# Patient Record
Sex: MALE | Race: WHITE | NOT HISPANIC OR LATINO | URBAN - METROPOLITAN AREA
[De-identification: names, ages, dates, MRNs, and addresses within clinical notes are randomized per-mention and may not be internally consistent; named-entity substitution may affect disease eponyms.]

---

## 2023-03-14 ENCOUNTER — OUTPATIENT (OUTPATIENT)
Dept: OUTPATIENT SERVICES | Facility: HOSPITAL | Age: 73
LOS: 1 days | End: 2023-03-14

## 2023-03-14 ENCOUNTER — APPOINTMENT (OUTPATIENT)
Dept: OPHTHALMOLOGY | Facility: CLINIC | Age: 73
End: 2023-03-14

## 2023-03-16 DIAGNOSIS — H26.9 UNSPECIFIED CATARACT: ICD-10-CM

## 2023-03-28 PROBLEM — Z00.00 ENCOUNTER FOR PREVENTIVE HEALTH EXAMINATION: Status: ACTIVE | Noted: 2023-03-28

## 2023-03-29 RX ORDER — TROPICAMIDE 1 %
1 DROPS OPHTHALMIC (EYE)
Refills: 0 | Status: COMPLETED | OUTPATIENT
Start: 2023-04-03 | End: 2023-04-03

## 2023-03-29 RX ORDER — PHENYLEPHRINE HCL 2.5 %
1 DROPS OPHTHALMIC (EYE)
Refills: 0 | Status: COMPLETED | OUTPATIENT
Start: 2023-04-03 | End: 2023-04-03

## 2023-03-29 RX ORDER — SODIUM CHLORIDE 9 MG/ML
1000 INJECTION, SOLUTION INTRAVENOUS
Refills: 0 | Status: DISCONTINUED | OUTPATIENT
Start: 2023-04-03 | End: 2023-04-03

## 2023-03-29 RX ORDER — OFLOXACIN 0.3 %
1 DROPS OPHTHALMIC (EYE)
Refills: 0 | Status: COMPLETED | OUTPATIENT
Start: 2023-04-03 | End: 2023-04-03

## 2023-03-29 RX ORDER — CYCLOPENTOLATE HYDROCHLORIDE 10 MG/ML
1 SOLUTION/ DROPS OPHTHALMIC
Refills: 0 | Status: COMPLETED | OUTPATIENT
Start: 2023-04-03 | End: 2023-04-03

## 2023-03-31 NOTE — ASU PATIENT PROFILE, ADULT - ABLE TO REACH PT
Voicemail; arrival time 12noon/ no solid food or milk after 12 mid-night on sunday/ water or clear apple juice no later than 10am DOS/ photo ID and insurance card/ comfortable clothing/ escort to take you home/ address and phone number/no

## 2023-03-31 NOTE — ASU PATIENT PROFILE, ADULT - FALL HARM RISK - UNIVERSAL INTERVENTIONS
Bed in lowest position, wheels locked, appropriate side rails in place/Call bell, personal items and telephone in reach/Instruct patient to call for assistance before getting out of bed or chair/Non-slip footwear when patient is out of bed/[place] to call system/Physically safe environment - no spills, clutter or unnecessary equipment/Purposeful Proactive Rounding/Room/bathroom lighting operational, light cord in reach

## 2023-03-31 NOTE — ASU PATIENT PROFILE, ADULT - NSICDXPASTSURGICALHX_GEN_ALL_CORE_FT
PAST SURGICAL HISTORY:  H/O hernia repair     H/O knee surgery     H/O prostatectomy     H/O shoulder surgery     History of fusion of lumbar spine     History of tonsillectomy

## 2023-04-03 ENCOUNTER — TRANSCRIPTION ENCOUNTER (OUTPATIENT)
Age: 73
End: 2023-04-03

## 2023-04-03 ENCOUNTER — OUTPATIENT (OUTPATIENT)
Dept: OUTPATIENT SERVICES | Facility: HOSPITAL | Age: 73
LOS: 1 days | Discharge: HOME | End: 2023-04-03

## 2023-04-03 VITALS
SYSTOLIC BLOOD PRESSURE: 120 MMHG | TEMPERATURE: 98 F | DIASTOLIC BLOOD PRESSURE: 70 MMHG | WEIGHT: 157.85 LBS | HEART RATE: 62 BPM | RESPIRATION RATE: 14 BRPM | HEIGHT: 70 IN

## 2023-04-03 VITALS
DIASTOLIC BLOOD PRESSURE: 67 MMHG | HEART RATE: 60 BPM | RESPIRATION RATE: 16 BRPM | TEMPERATURE: 97 F | SYSTOLIC BLOOD PRESSURE: 120 MMHG

## 2023-04-03 DIAGNOSIS — Z90.89 ACQUIRED ABSENCE OF OTHER ORGANS: Chronic | ICD-10-CM

## 2023-04-03 DIAGNOSIS — Z90.79 ACQUIRED ABSENCE OF OTHER GENITAL ORGAN(S): Chronic | ICD-10-CM

## 2023-04-03 DIAGNOSIS — Z98.1 ARTHRODESIS STATUS: Chronic | ICD-10-CM

## 2023-04-03 DIAGNOSIS — Z98.890 OTHER SPECIFIED POSTPROCEDURAL STATES: Chronic | ICD-10-CM

## 2023-04-03 DEVICE — LENS IOL ACRYSOF SN60WF 22.5D
Type: IMPLANTABLE DEVICE | Site: LEFT (LEFT EYE) | Status: NON-FUNCTIONAL
Removed: 2023-04-03

## 2023-04-03 RX ADMIN — Medication 1 DROP(S): at 13:20

## 2023-04-03 RX ADMIN — Medication 1 DROP(S): at 13:15

## 2023-04-03 RX ADMIN — Medication 1 DROP(S): at 13:25

## 2023-04-03 RX ADMIN — CYCLOPENTOLATE HYDROCHLORIDE 1 DROP(S): 10 SOLUTION/ DROPS OPHTHALMIC at 13:15

## 2023-04-03 RX ADMIN — CYCLOPENTOLATE HYDROCHLORIDE 1 DROP(S): 10 SOLUTION/ DROPS OPHTHALMIC at 13:20

## 2023-04-03 RX ADMIN — CYCLOPENTOLATE HYDROCHLORIDE 1 DROP(S): 10 SOLUTION/ DROPS OPHTHALMIC at 13:25

## 2023-04-03 NOTE — OPERATIVE REPORT - OPERATIVE RPOSRT DETAILS
Date of Procedure:04/03/2023    Surgeon: Adali Alfaro    Assistant Surgeon: Elise Lara MD    PRE-OP DIAGNOSIS: Floppy Iris, Complex cataract extraction with Trypan Blue,  Astigmatism-_____eye    POST-OP DIAGNOSIS: Same    ANESTHESIA: MAC    PROCEDURE: Complex (FLACS) cataract extraction Lens Implant     SPECIMEN/TISSUE REMOVED: None    ESTIMATED BLOOD LOSS: < 1mL    COMPLICATIONS: None    OPERATIVE PROCEDURE:  Complex Femtosecond laser assisted cataract surgery (FLACS) with insertion of posterior chamber intraocular lens, ______eye    The patient was brought into the laser room. After installation of topical anesthetic the femtosecond laser was used for select steps of the cataract procedure.   The patient was brought into the operating room and placed supine on the operating room table. After uneventful induction of neuroleptic anesthesia, additional topical tetracaine was instilled into the operative eye achieving sufficient anesthesia. The patient was then prepped and draped in the usual sterile fashion. A wire lid speculum was then inserted into the operative eye giving a wide palpebral fissure.    Two  paracentesis through clear cornea were created.  The anterior chamber was irrigated with lidocaine 1% nonpreserved. One percent preservative free lidocaine was injected into the anterior chamber.  The Iris was Billowing and prolapsing thru the paracentesis.  Preservative free Epinephreine was injected to stabalize iris and increase dilation.  Trypan blue was injected into the anterior chamber and irrigated out with balanced salt solution.   Viscoelastic was then used to maintain the anterior chamber. A keratome was used to create a clear corneal incision just inside the temporal limbus. A Utrata forceps was used to complete the anterior capsulorrhexis and the freed capsule was removed from the eye. Balanced salt solution was used to hydrodissect the nucleus. The phacoemulsification unit was then used to completely phacoemulsify the nucleus, following which an aspirating hand piece was used to aspirate all cortical remnants from the capsular bag. Viscoelastic was again used to reform the anterior chamber and capsular bag.    A new foldable posterior chamber intraocular lens was brought into the surgical field. It was folded and directly injected into the capsular bag following which it was centered and secure. All viscoelastic was then aspirated from the anterior segment using an aspirating hand piece. All wounds were hydrated and found to be watertight.  The wounds remained watertight. The lid speculum was removed from the eye and a shield placed over the eye.  The patient received topical Vigamox and pred forte eye drops. The patient also received Tobradex eye ointment, and the eye was patched and shielded.  The patient was brought to the recovery room in stable condition, alert and oriented times three.  The patient was reminded to follow up in the office the next day.                     Date of Procedure:04/03/2023    Surgeon: Adali Alfaro    Assistant Surgeon: Elise Lara MD    PRE-OP DIAGNOSIS: Meiosis, Complex cataract extraction with Trypan Blue,  Astigmatism-__Left___eye    POST-OP DIAGNOSIS: Same    ANESTHESIA: MAC    PROCEDURE: Complex (FLACS) cataract extraction Lens Implant   Henrik implant: [id number] 22.5 D    SPECIMEN/TISSUE REMOVED: None    ESTIMATED BLOOD LOSS: < 1mL    COMPLICATIONS: None    OPERATIVE PROCEDURE:  Complex Femtosecond laser assisted cataract surgery (FLACS) with insertion of posterior chamber intraocular lens, ______eye    The patient was brought into the laser room. After installation of topical anesthetic the femtosecond laser was used for Arcuate incisions, capsulotomy, and nuclear fragmentation, of the cataract procedure.   The patient was brought into the operating room and placed supine on the operating room table. After uneventful induction of neuroleptic anesthesia, additional topical tetracaine was instilled into the operative eye achieving sufficient anesthesia. The patient was then prepped and draped in the usual sterile fashion. A wire lid speculum was then inserted into the operative eye giving a wide palpebral fissure.    Two  paracentesis through clear cornea were created.  The anterior chamber was irrigated with lidocaine 1% nonpreserved. One percent preservative free lidocaine was injected into the anterior chamber.  The Iris 3 mm.  Preservative free Epinephreine was injected to stabalize iris and increase dilation. The Iris was stretched with 2 kuglens.  Trypan blue was injected into the anterior chamber and irrigated out with balanced salt solution.   Viscoelastic was then used to maintain the anterior chamber. A keratome was used to create a clear corneal incision just inside the temporal limbus. A Utrata forceps was used to complete the anterior capsulorrhexis and the freed capsule was removed from the eye. Balanced salt solution was used to hydrodissect the nucleus. The phacoemulsification unit was then used to completely phacoemulsify the nucleus, following which an aspirating hand piece was used to aspirate all cortical remnants from the capsular bag. Viscoelastic was again used to reform the anterior chamber and capsular bag.    A new foldable posterior chamber intraocular lens was brought into the surgical field. It was folded and directly injected into the capsular bag following which it was centered and secure. All viscoelastic was then aspirated from the anterior segment using an aspirating hand piece. All wounds were hydrated and found to be watertight.  The wounds remained watertight. The lid speculum was removed from the eye and a shield placed over the eye.  The patient received topical Vigamox and pred forte eye drops. The patient also received Tobradex eye ointment, and the eye was patched and shielded.  The patient was brought to the recovery room in stable condition, alert and oriented times three.  The patient was reminded to follow up in the office the next day.

## 2023-05-02 PROBLEM — Z98.890 OTHER SPECIFIED POSTPROCEDURAL STATES: Chronic | Status: ACTIVE | Noted: 2023-04-03

## 2023-05-02 PROBLEM — C61 MALIGNANT NEOPLASM OF PROSTATE: Chronic | Status: ACTIVE | Noted: 2023-04-03

## 2023-05-02 PROBLEM — E78.5 HYPERLIPIDEMIA, UNSPECIFIED: Chronic | Status: ACTIVE | Noted: 2023-04-03

## 2023-05-05 NOTE — ASU PATIENT PROFILE, ADULT - SITE
cataract extraction  with IOl implantation  Femto laser  with arcuate  incisions , right eye laterality

## 2023-05-05 NOTE — ASU PATIENT PROFILE, ADULT - NS PREOP UNDERSTANDS INFO
spoke to patient to be npo after 12Mn. allow to drink water and apple juice till 6-7 am , dress comfortable,  leave all valuable at home, Bring ID photo and insurance cards,  escort arranged, address and telephone given to patient/yes

## 2023-05-05 NOTE — ASU PATIENT PROFILE, ADULT - FALL HARM RISK - HARM RISK INTERVENTIONS

## 2023-05-08 ENCOUNTER — OUTPATIENT (OUTPATIENT)
Dept: OUTPATIENT SERVICES | Facility: HOSPITAL | Age: 73
LOS: 1 days | Discharge: HOME | End: 2023-05-08

## 2023-05-08 ENCOUNTER — TRANSCRIPTION ENCOUNTER (OUTPATIENT)
Age: 73
End: 2023-05-08

## 2023-05-08 VITALS
SYSTOLIC BLOOD PRESSURE: 105 MMHG | TEMPERATURE: 97 F | OXYGEN SATURATION: 98 % | RESPIRATION RATE: 16 BRPM | HEART RATE: 62 BPM | DIASTOLIC BLOOD PRESSURE: 62 MMHG

## 2023-05-08 VITALS
WEIGHT: 156.53 LBS | HEART RATE: 64 BPM | OXYGEN SATURATION: 99 % | DIASTOLIC BLOOD PRESSURE: 71 MMHG | RESPIRATION RATE: 14 BRPM | TEMPERATURE: 98 F | HEIGHT: 70 IN | SYSTOLIC BLOOD PRESSURE: 122 MMHG

## 2023-05-08 DIAGNOSIS — Z98.890 OTHER SPECIFIED POSTPROCEDURAL STATES: Chronic | ICD-10-CM

## 2023-05-08 DIAGNOSIS — Z90.89 ACQUIRED ABSENCE OF OTHER ORGANS: Chronic | ICD-10-CM

## 2023-05-08 DIAGNOSIS — Z98.1 ARTHRODESIS STATUS: Chronic | ICD-10-CM

## 2023-05-08 DIAGNOSIS — Z90.79 ACQUIRED ABSENCE OF OTHER GENITAL ORGAN(S): Chronic | ICD-10-CM

## 2023-05-08 DEVICE — LENS IOL ACRYSOF SN60WF 19.0D
Type: IMPLANTABLE DEVICE | Site: RIGHT | Status: NON-FUNCTIONAL
Removed: 2023-05-08

## 2023-05-08 RX ORDER — PHENYLEPHRINE HCL 2.5 %
1 DROPS OPHTHALMIC (EYE)
Refills: 0 | Status: DISCONTINUED | OUTPATIENT
Start: 2023-05-08 | End: 2023-05-08

## 2023-05-08 RX ORDER — ACETAMINOPHEN 500 MG
1000 TABLET ORAL ONCE
Refills: 0 | Status: DISCONTINUED | OUTPATIENT
Start: 2023-05-08 | End: 2023-05-08

## 2023-05-08 RX ORDER — TROPICAMIDE 1 %
1 DROPS OPHTHALMIC (EYE)
Refills: 0 | Status: DISCONTINUED | OUTPATIENT
Start: 2023-05-08 | End: 2023-05-08

## 2023-05-08 RX ORDER — SODIUM CHLORIDE 9 MG/ML
1000 INJECTION, SOLUTION INTRAVENOUS
Refills: 0 | Status: DISCONTINUED | OUTPATIENT
Start: 2023-05-08 | End: 2023-05-08

## 2023-05-08 RX ORDER — CYCLOPENTOLATE HYDROCHLORIDE 10 MG/ML
1 SOLUTION/ DROPS OPHTHALMIC
Refills: 0 | Status: DISCONTINUED | OUTPATIENT
Start: 2023-05-08 | End: 2023-05-08

## 2023-05-08 RX ORDER — ROSUVASTATIN CALCIUM 5 MG/1
1 TABLET ORAL
Refills: 0 | DISCHARGE

## 2023-05-08 RX ORDER — ONDANSETRON 8 MG/1
4 TABLET, FILM COATED ORAL ONCE
Refills: 0 | Status: DISCONTINUED | OUTPATIENT
Start: 2023-05-08 | End: 2023-05-08

## 2023-05-08 RX ORDER — OFLOXACIN 0.3 %
1 DROPS OPHTHALMIC (EYE)
Refills: 0 | Status: DISCONTINUED | OUTPATIENT
Start: 2023-05-08 | End: 2023-05-08

## 2023-05-08 RX ORDER — SODIUM CHLORIDE 9 MG/ML
500 INJECTION, SOLUTION INTRAVENOUS
Refills: 0 | Status: DISCONTINUED | OUTPATIENT
Start: 2023-05-08 | End: 2023-05-08

## 2023-05-08 NOTE — OPERATIVE REPORT - OPERATIVE RPOSRT DETAILS
Date of Procedure: 05/08/2023    Surgeon: Adali Alfaro    Assistant Surgeon: Elise Lara      PREOPERATIVE DIAGNOSIS:  Cataract, Astigmatism-_Right____eye    POSTOPERATIVE DIAGNOSIS:  Same- _Right______eye      OPERATIVE PROCEDURE:  Femtosecond laser assisted cataract surgery (FLACS) with insertion of posterior chamber intraocular lens, _Right _____eye    LENS USED: [id number]   LENS POWER: 19.0 D  PROCEDURE:    The patient was brought into the laser room. After installation of topical anesthetic the femtosecond laser was used for select steps of the cataract procedure.   The patient was brought into the operating room and placed supine on the operating room table. After uneventful induction of neuroleptic anesthesia, additional topical tetracaine was instilled into the operative eye achieving sufficient anesthesia. The patient was then prepped and draped in the usual sterile fashion. A wire lid speculum was then inserted into the operative eye giving a wide palpebral fissure.    Two  paracentesis through clear cornea were created.  The anterior chamber was irrigated with lidocaine 1% nonpreserved. When available preservative free epinephrine was also placed intracamerally for additional pupil dilation.  Viscoelastic was then used to maintain the anterior chamber. A keratome was used to create a clear corneal incision just inside the temporal limbus. A Utrata forceps was used to complete the anterior capsulorrhexis and the freed capsule was removed from the eye. Balanced salt solution was used to hydrodissect the nucleus. The phacoemulsification unit was then used to completely phacoemulsify the nucleus, following which an aspirating hand piece was used to aspirate all cortical remnants from the capsular bag. Viscoelastic was again used to reform the anterior chamber and capsular bag.    A new foldable posterior chamber intraocular lens was brought into the surgical field. It was folded and directly injected into the capsular bag following which it was centered and secure. All viscoelastic was then aspirated from the anterior segment using an aspirating hand piece. All wounds were hydrated and found to be watertight.  The wounds remained watertight. The lid speculum was removed from the eye and a shield placed over the eye.  An antibiotic/steroid mixture was irrigated into the conjunctival cul de sac. The patient tolerated the procedure well and went to the recovery area in good condition.

## 2024-09-03 NOTE — ASU PATIENT PROFILE, ADULT - REASON FOR ADMISSION, PROFILE
Detail Level: Detailed
Quality 226: Preventive Care And Screening: Tobacco Use: Screening And Cessation Intervention: Patient screened for tobacco use and is an ex/non-smoker
eye surgery

## (undated) DEVICE — WARMING BLANKET LOWER ADULT

## (undated) DEVICE — GLV 6.5 PROTEXIS (WHITE)

## (undated) DEVICE — PACK CENTURION 2.2MM

## (undated) DEVICE — DRAPE MICROSCOPE KNOB COVER SMALL (2 PCS)

## (undated) DEVICE — SOL IRR BAL SALT 500ML

## (undated) DEVICE — GLV 7 PROTEXIS (WHITE)

## (undated) DEVICE — Device

## (undated) DEVICE — PACK ANTERIOR SEGMENT

## (undated) DEVICE — SUT NYLON 10-0 12" CU-5